# Patient Record
Sex: MALE | Race: WHITE | NOT HISPANIC OR LATINO | URBAN - METROPOLITAN AREA
[De-identification: names, ages, dates, MRNs, and addresses within clinical notes are randomized per-mention and may not be internally consistent; named-entity substitution may affect disease eponyms.]

---

## 2017-07-12 ENCOUNTER — OFFICE VISIT (OUTPATIENT)
Dept: BEHAVIORAL HEALTH | Facility: PHYSICIAN GROUP | Age: 22
End: 2017-07-12
Payer: COMMERCIAL

## 2017-07-12 DIAGNOSIS — Z79.899 LONG TERM USE OF DRUG: ICD-10-CM

## 2017-07-12 DIAGNOSIS — F31.9 BIPOLAR I DISORDER (HCC): ICD-10-CM

## 2017-07-12 DIAGNOSIS — F41.1 GAD (GENERALIZED ANXIETY DISORDER): ICD-10-CM

## 2017-07-12 DIAGNOSIS — Z79.899 LITHIUM USE: ICD-10-CM

## 2017-07-12 PROCEDURE — 90792 PSYCH DIAG EVAL W/MED SRVCS: CPT | Performed by: NURSE PRACTITIONER

## 2017-07-12 RX ORDER — ILOPERIDONE 8 MG/1
TABLET ORAL
Refills: 2 | COMMUNITY
Start: 2017-06-29 | End: 2017-07-31 | Stop reason: SDUPTHER

## 2017-07-12 RX ORDER — BENZTROPINE MESYLATE 0.5 MG/1
TABLET ORAL
Refills: 0 | COMMUNITY
Start: 2017-06-23 | End: 2017-08-09 | Stop reason: SDUPTHER

## 2017-07-12 RX ORDER — LORATADINE 10 MG/1
10 TABLET ORAL DAILY
COMMUNITY

## 2017-07-12 RX ORDER — LITHIUM CARBONATE 600 MG/1
CAPSULE ORAL
Refills: 0 | COMMUNITY
Start: 2017-07-05 | End: 2017-08-02 | Stop reason: SDUPTHER

## 2017-07-12 RX ORDER — ILOPERIDONE 12 MG/1
TABLET ORAL
Refills: 0 | COMMUNITY
Start: 2017-06-01 | End: 2017-07-31 | Stop reason: SDUPTHER

## 2017-07-12 NOTE — PROGRESS NOTES
BEHAVIORAL HEALTH  INITIAL PSYCHIATRIC EVALUATION    Name: Rafael Parker  MRN: [id number]  : 1995  Age: 21 y.o.  Date of assessment: 2017  PCP: Marin Longo M.D.  Persons in attendance:Patient and mom, patient also interviewed alone  Total face-to-face time: 52 minutes    CHIEF COMPLAINT AND HISTORY OF PRESENTING PROBLEM:   (As stated by Patient)  Rafael Parker is a 21 y.o., White male referred for assessment by No ref. provider found. Primary presenting issue includes: establishing treatment for bipolar disorder and anxiety medication managment.    HISTORY OF PRESENT ILLNESS:    Patient and mother report he has been seeing a child psychiatrist in [place], would like to now see someone local that can prescribe medications. At age 18 patient was diagnosed bipolar I, after a manic episode with psychosis resulting in a [facility] stay. Patient reports he had bouts of depression during childhood preceeding the manic event. While panic patient had paranoia, grandiosity, did not need to sleep and reports bizarre a/v hallucinations. Patient has been fairly stable overall since getting on the right combination of medication, currently takes lithium , Fanapt, and cogentin. Patient at times has anxiety attacks, has a prescription for anxiety that he rarely takes. 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 Sleeping 9-10 hours/night, denies any suicidal thoughts or thoughts of self harm. Patient does tend to worry a lot about lots of different things. Current life stressors include family financial stress. 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     CURRENT PSYCHIATRIC MEDS:  Lithium 600mg BID  Cogentin 1 mg take 0.5 mg BID  Fanapt, takes 8 mg in morning and 12 hs    PAST PSYCHIATRIC MEDICATIONS TRIED:  Risperidone-caused gynecomastia,  "Parkinsonism  lamictal-patient got rash  Geodon, Seroquel-did not work  Depakote, took only a few weeks not sure why stopped  Intuniv-cannot recall why he was taking it, maybe anxiety or ADHD but was not \"too bad\"   FAMILY/SOCIAL HISTORY:  Does patient/parent report a family history of behavioral health issues, diagnoses, or treatment? mom has depression, sister bipolar  Family History   Problem Relation Age of Onset   • Depression Mother    • Other Mother      chiari malformation   • Bipolar disorder Sister      ?   • Asthma Sister      Marital status: single  Current living situation/household members: lives with family  Relevant family history/structure/dynamics: Born/raised in [facility] [facility]. family intact, no abuse. Closer to his mom than his father who can be more boudreaux.   Quality/quantity of current family and/or social support: supportive family, has made good friends    Social History     Social History   • Marital Status: Single     Spouse Name: N/A   • Number of Children: N/A   • Years of Education: N/A     Occupational History   • Not on file.     Social History Main Topics   • Smoking status: Never Smoker    • Smokeless tobacco: Not on file   • Alcohol Use: No   • Drug Use: No   • Sexual Activity: Not on file     Other Topics Concern   • Not on file     Social History Narrative         EMPLOYMENT/RESOURCES  Is the patient currently employed? yes  History of employment problems?no  Does the patient/parent report adequate financial resources? Yes  Does patient identify impact of presenting issue on work functioning?no   Work or income-related stressors:  New career/job     HISTORY:  Does patient report current or past enlistment? no   If yes, describe experiences of duty: n/a    SPIRITUAL/CULTURAL/IDENTITY:  What are the patient’s/family’s spiritual beliefs or practices? jacobo  What is the patient’s cultural or ethnic background/identity?   How does the patient identify their sexual " orientation? heterosexual  How does the patient identify their gender? male  Does the patient identify any spiritual/cultural/identity factors as relevant to the presenting issue? no      PSYCHIATRIC TREATMENT HISTORY:  Does patient/parent report a history of outpatient psychiatric treatment for patient? Patient saw Dr. Ingram in [place] most recently, also saw Dr. Espinoza at [facility] in past       Does patient/parent report a history of psychiatric hospitalizations for patient? Yes, [place] age 18      Does patient/parent report a history of psychotherapy or other behavioral health treatment for patient?   Counseling in the past        PAST MEDICAL HISTORY:    Eczema, allergies, hypoglycemia, repair of thumb (cut)      REVIEW OF SYSTEMS:      General appearance: casually dressed  male, appears stated age of 21. No gross deformities noted. Pleasant and cooperative.    Constitutional Maybe fever in past few days, not feeling well with cold   Eyes Wears eyeglasses, no glaucoma or cataracts   Ears/Nose/Mouth/Throat Bilateral hearing intact, currently has sore throat   Cardiovascular No HTN or arrhythmia   Respiratory Productive cough   Gastrointestinal No diarrhea or constipation   Genitourinary Frequent urination, drinks a lot of water   Muscular/skeletal No pain, no joint or bone pain or muscle problems   Integumentary Eczema, on fingers   Neurological Tremor in hands, side effect of lithium. No seizure history   Endocrine No diabetes, does have hypoglycemia at times. No thyroid  Disease. Feels his blood sugar low, relieved when eating   Hematologic/Lymphatic No anemia or blood clotting problems        Past Medical History   Diagnosis Date   • Eczema    • Bipolar disorder (CMS-HCC)    • Anxiety    • Hypoglycemia      patient reports       LABS REVIEWED: no recent available    Medication Allergies  Review of patient's allergies indicates not on file.    Medications (non psychiatric)  Current Outpatient  Prescriptions   Medication Sig Dispense Refill   • benztropine (COGENTIN) 0.5 MG Tab TK 1 T PO BID  0   • FANAPT 8 MG Tab TK 1 TO 1 AND 1/2 TS PO D  2   • FANAPT 12 MG Tab TK 1 T PO QHS  0   • lithium 600 MG capsule TK 1 C PO BID  0   • loratadine (CLARITIN) 10 MG Tab Take 10 mg by mouth every day.       No current facility-administered medications for this visit.       DEVELOPMENTAL HISTORY:  Delivery:post date pregnancy  Birth weight: 8 lbs 14 oz  Prenatal complications:  Mom had pre-eclampsia, had to take magnesium   complications:  Shoulder dystocia   complications:  Allergic to formula at early age  In utero substance exposure:  no    Reached developmental milestones within normal limits? Patient reports executive function deficit diagnosed as child   Gross motor:  yes  Fine motor:  yes   Speech:  yes   Social interaction:  Patient used to have some difficulty keeping friends, now is doing better.     EDUCATIONAL:  Highest level of education completed? 12th grade, +CNA training  Typical grades received:good grades, graduated with advanced diploma  History of problems at school as child/adolescent: patient had IEP plan  Is patient currently enrolled in a school/educational program? No, but plans to continue his education      Psychiatric Review of Systems:   Mood: Other: euthymic  Anxiety: Frequent worry  Sleep: Typical hours per night: 9 hours  Psychomotor/Energy: Other: within normal limits  Eating/Appetite: Other: within normal limits  Cognitive: Other: dx with executive function problem as child  Behavior: Other: no risky behaviors   Psychosis: Other: history of paranoia and a/v hallucinations while manic, none currently  Social: Other: previously struggled to keep friends, is doing better now and has a few good friends  Sensory: Other: within normal limits         LEGAL HISTORY:  Has the patient ever been involved with juvenile, adult, or family legal systems? no      ABUSE/NEGLECT  SCREENING:  Does patient report feeling “unsafe” in his/her home, or afraid of anyone? no  Does patient report any history of physical, sexual, or emotional abuse? no  Does parent or significant other report any of the above? no  Is there evidence of neglect by self? no  Is there evidence of neglect by a caregiver? no  Does the patient/parent report any history of CPS/APS/police involvement related to suspected abuse/neglect or domestic violence? no    Based on the information provided during the current assessment, is a mandated report of suspected abuse/neglect being made? no      SAFETY ASSESSMENT - SELF:  Does patient acknowledge current or past symptoms of dangerousness to self? Past thoughts of self harm, never cut himself or had any suicide attempts     Does parent/significant other report patient has current or past symptoms of dangerousness to self? no      History of suicide by family member: no  History of suicide by friend/significant other: no    Recent change in amount/specificity/intensity of suicidal thoughts or self-harm behavior?no  Current access to firearms, medications, or other identified means of suicide/self-harm? n/a  If yes, willing to restrict access to means of suicide/self-harm? n/a  Protective factors present: Future-oriented and Strong family connections    Current Suicide Risk: Low  Safety Plan completed, documented in chart, and copy given to patient: No     Crisis Safety Plan completed and copy given to patient: No     SAFETY ASSESSMENT - OTHERS:  Does patient acknowledge current or past symptoms of aggressive behavior or risk to others? none      SUBSTANCE USE/ADDICTION HISTORY:  [] Not applicable - patient 10 years of age or younger    Is there a family history of substance use/addiction? no  Does patient acknowledge or parent/significant other report use of/dependence on substances? no  Last time patient used alcohol: none  Within the past week? n/a  Last time patient used  "marijuana: none  Within the past month? n/a  Any other street drugs ever tried even once? no  Any use of prescription medications/pills without a prescription, or for reasons others than originally prescribed?  no  Any other addictive behavior reported (gambling, shopping, sex)? no    Drug History:  Amphetamine:      Cannibis:      Cocaine:      Ecstasy:      Hallucinogen:      Inhalant:       Opiate:      Other:      Sedative:         What consequences does the patient associate with any of the above substance use and or addictive behaviors?   None        [x] Patient denies use of any substance/addictive behaviors    STRENGTHS/ASSETS:  Strengths Identified by interviewer: Insight into problems, Evidence of good judgement, Self-awareness and Family suppport  Strengths Identified by patient: kind heart, empathetic towards others    MENTAL STATUS EXAM/OBSERVATIONS  Resp 15  Ht 1.778 m (5' 10\")  Wt 74.844 kg (165 lb)  BMI 23.68 kg/m2  Participation: Active verbal participation, Attentive, Engaged and Open to feedback  Grooming:  Casual and Neat  Orientation: Fully Oriented   Eye contact:  Good  Behavior: Calm   Mood:  Euthymic and Anxious  Affect: Full range and Congruent with content  Thought process: Logical and Goal-directed  Thought content: Within normal limits  Speech: Rate within normal limits and Volume within normal limits  Perception: Within normal limits  Memory:  No gross evidence of memory deficits  Insight:  Good  Judgment: Good  Other:   Family/couple interaction observations: mutually respectful towards eachother    RESULTS OF SCREENING MEASURES: none      CLINICAL FORMULATION: 21 year old  hetero male, presents with history consistent with bipolar I disorder and RODRIGO. Hospitalized for manic episode at age 18, mood stable on current medications. Patient has supportive family, no history of abuse. Willing to engage in treatment. Strong history of allergies and eczema, frequent urination. Mom " has depression, sister diagnosed with bipolar. Family reports patient previously diagnosed with executive function disorder, had IEP during school but graduated with advanced diploma and looks forward to going to college. No dependence on drugs or alcohol, no suicide attempts.       DIAGNOSTIC IMPRESSION(S):  1. Bipolar I disorder (CMS-HCC)    2. RODRIGO (generalized anxiety disorder)    3. Lithium use    4. Long term use of drug         IDENTIFIED NEEDS/PLAN: continue current medications. Reviewed risks/benefits of each medication with patient and mother, reviewed lithium toxicity s/s, need to monitor lithium level,kidney function, weight, thyroid function over time. Metabolic risks of Fanapt discussed, will monitor glucose as well as lipids. Patient offered referral to individual therapy to help him process his anxiety, will consider but declines today. Educate patient and mom need to check for drug-drug interactions, NSAIDS not safe to take with lithium.       Current outpatient prescriptions:   •  benztropine (COGENTIN) 0.5 MG Tab, TK 1 T PO BID, Disp: , Rfl: 0  •  FANAPT 8 MG Tab, TK 1 TO 1 AND 1/2 TS PO D, Disp: , Rfl: 2  •  FANAPT 12 MG Tab, TK 1 T PO QHS, Disp: , Rfl: 0  •  lithium 600 MG capsule, TK 1 C PO BID, Disp: , Rfl: 0  •  loratadine (CLARITIN) 10 MG Tab, Take 10 mg by mouth every day., Disp: , Rfl:     Does patient express agreement with the above plan? yes    Referral appointment(s) scheduled? no    Recheck 2-3 months or sooner if needed    EVIN Cary.

## 2017-07-14 VITALS — BODY MASS INDEX: 23.62 KG/M2 | RESPIRATION RATE: 15 BRPM | HEIGHT: 70 IN | WEIGHT: 165 LBS

## 2017-07-14 PROBLEM — F41.1 GAD (GENERALIZED ANXIETY DISORDER): Status: ACTIVE | Noted: 2017-07-14

## 2017-07-14 PROBLEM — F31.9 BIPOLAR I DISORDER (HCC): Status: ACTIVE | Noted: 2017-07-14

## 2017-07-31 RX ORDER — ILOPERIDONE 12 MG/1
TABLET ORAL
Qty: 30 TAB | Refills: 1 | Status: SHIPPED | OUTPATIENT
Start: 2017-07-31 | End: 2017-09-13 | Stop reason: SDUPTHER

## 2017-07-31 RX ORDER — ILOPERIDONE 8 MG/1
TABLET ORAL
Qty: 45 TAB | Refills: 1 | Status: SHIPPED | OUTPATIENT
Start: 2017-07-31 | End: 2017-09-13 | Stop reason: SDUPTHER

## 2017-08-02 RX ORDER — LITHIUM CARBONATE 600 MG/1
CAPSULE ORAL
Qty: 60 CAP | Refills: 1 | Status: SHIPPED | OUTPATIENT
Start: 2017-08-02 | End: 2017-09-13 | Stop reason: SDUPTHER

## 2017-08-09 RX ORDER — BENZTROPINE MESYLATE 0.5 MG/1
TABLET ORAL
Qty: 60 TAB | Refills: 1 | Status: SHIPPED | OUTPATIENT
Start: 2017-08-09 | End: 2017-11-14 | Stop reason: SDUPTHER

## 2017-09-13 ENCOUNTER — HOSPITAL ENCOUNTER (OUTPATIENT)
Dept: LAB | Facility: MEDICAL CENTER | Age: 22
End: 2017-09-13
Attending: NURSE PRACTITIONER
Payer: COMMERCIAL

## 2017-09-13 ENCOUNTER — OFFICE VISIT (OUTPATIENT)
Dept: BEHAVIORAL HEALTH | Facility: PHYSICIAN GROUP | Age: 22
End: 2017-09-13
Payer: COMMERCIAL

## 2017-09-13 VITALS
SYSTOLIC BLOOD PRESSURE: 102 MMHG | WEIGHT: 168.5 LBS | HEIGHT: 70 IN | DIASTOLIC BLOOD PRESSURE: 76 MMHG | BODY MASS INDEX: 24.12 KG/M2 | HEART RATE: 84 BPM

## 2017-09-13 DIAGNOSIS — F31.9 BIPOLAR I DISORDER (HCC): ICD-10-CM

## 2017-09-13 DIAGNOSIS — Z79.899 LONG TERM USE OF DRUG: ICD-10-CM

## 2017-09-13 DIAGNOSIS — F41.1 GAD (GENERALIZED ANXIETY DISORDER): ICD-10-CM

## 2017-09-13 DIAGNOSIS — Z79.899 LITHIUM USE: ICD-10-CM

## 2017-09-13 LAB
ALBUMIN SERPL BCP-MCNC: 4.6 G/DL (ref 3.2–4.9)
ALBUMIN/GLOB SERPL: 1.8 G/DL
ALP SERPL-CCNC: 90 U/L (ref 30–99)
ALT SERPL-CCNC: 17 U/L (ref 2–50)
ANION GAP SERPL CALC-SCNC: 8 MMOL/L (ref 0–11.9)
AST SERPL-CCNC: 19 U/L (ref 12–45)
BILIRUB SERPL-MCNC: 0.5 MG/DL (ref 0.1–1.5)
BUN SERPL-MCNC: 14 MG/DL (ref 8–22)
CALCIUM SERPL-MCNC: 9.7 MG/DL (ref 8.5–10.5)
CHLORIDE SERPL-SCNC: 106 MMOL/L (ref 96–112)
CO2 SERPL-SCNC: 25 MMOL/L (ref 20–33)
CREAT SERPL-MCNC: 0.85 MG/DL (ref 0.5–1.4)
GFR SERPL CREATININE-BSD FRML MDRD: >60 ML/MIN/1.73 M 2
GLOBULIN SER CALC-MCNC: 2.6 G/DL (ref 1.9–3.5)
GLUCOSE SERPL-MCNC: 109 MG/DL (ref 65–99)
LITHIUM SERPL-MCNC: 0.3 MMOL/L (ref 0.6–1.2)
POTASSIUM SERPL-SCNC: 3.7 MMOL/L (ref 3.6–5.5)
PROT SERPL-MCNC: 7.2 G/DL (ref 6–8.2)
SODIUM SERPL-SCNC: 139 MMOL/L (ref 135–145)
TSH SERPL DL<=0.005 MIU/L-ACNC: 0.67 UIU/ML (ref 0.3–3.7)

## 2017-09-13 PROCEDURE — 36415 COLL VENOUS BLD VENIPUNCTURE: CPT

## 2017-09-13 PROCEDURE — 84443 ASSAY THYROID STIM HORMONE: CPT

## 2017-09-13 PROCEDURE — 80178 ASSAY OF LITHIUM: CPT

## 2017-09-13 PROCEDURE — 99213 OFFICE O/P EST LOW 20 MIN: CPT | Performed by: NURSE PRACTITIONER

## 2017-09-13 PROCEDURE — 80053 COMPREHEN METABOLIC PANEL: CPT

## 2017-09-13 PROCEDURE — 90833 PSYTX W PT W E/M 30 MIN: CPT | Performed by: NURSE PRACTITIONER

## 2017-09-13 RX ORDER — LITHIUM CARBONATE 600 MG/1
CAPSULE ORAL
Qty: 60 CAP | Refills: 1 | Status: SHIPPED | OUTPATIENT
Start: 2017-09-13 | End: 2017-11-19 | Stop reason: SDUPTHER

## 2017-09-13 NOTE — PROGRESS NOTES
[facility]  PSYCHIATRIC FOLLOW-UP NOTE    Name: Rafael Parker  MRN: [id number]  : 1995  Age: 22 y.o.  Date of assessment: 2017  PCP: Marin Longo M.D.  Persons in attendance: Patient  Total face-to-face time: 29 minutes    REASON FOR VISIT/CHIEF COMPLAINT (as stated by Patient):  Rafael Parker is a 22 y.o., White male, attending follow-up appointment for recheck of bipolar disorder and RODRIGO.      HISTORY OF PRESENT ILLNESS:    Patient reports he has been feeling anxious today, not certain of whether it is because he has had 2 doctor appointments, got his eyes dilated today, or just because he has a lot on his mind. Feels he is breathing more rapidly right now. Verbalizes he has dealt with anxiety all of his life, and it doesn't stop him from functioning but he is noticing more frequent anxious feelings. 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 Does not feel depressed, is sleeping fairly well considering he is working nights at [employer] center and has to sleep during the day. On his days off he finds it harder to switch back to sleeping at night, prefers to sleep during the day. Denies suicidal thoughts or any problems with irma. Patient does endorse some compliance problems recently with medication, may have missed days of meds as he is tired related to work schedule and is wondering if that may be making him feel anxious.    PSYCHOSOCIAL CHANGES SINCE PREVIOUS CONTACT:  No drinking or drug use      MEDICATION SIDE EFFECTS: none    REVIEW OF SYSTEMS:      General appearance: casually dressed  male, good grooming/hygiene. Pleasant and cooperative.   Constitutional negative - no fever/chills   Eyes not tested   Ears/Nose/Mouth/Throat not tested   Cardiovascular not tested   Respiratory not tested   Gastrointestinal negative   Genitourinary not tested   Muscular not tested   Integumentary not  "tested   Neurological negative   Endocrine not tested   Hematologic/Lymphatic not tested       PSYCHIATRIC EXAMINATION/MENTAL STATUS  /76   Pulse 84   Ht 1.778 m (5' 10\")   Wt 76.4 kg (168 lb 8 oz)   BMI 24.18 kg/m²   Participation: Active verbal participation  Grooming:Casual and Neat  Orientation: Fully Oriented  Eye contact: Good  Behavior:Tense   Mood: Anxious  Affect: Full range and Congruent with content  Thought process: Logical and Goal-directed  Thought content:  Within normal limits  Speech: Rate within normal limits and Volume within normal limits  Perception:  Within normal limits  Memory:  No gross evidence of memory deficits  Insight: Good  Judgment: Good  Family/couple interaction observations:   Other:    Current risk:    Suicide: Low   Homicide: Not applicable   Self-harm: Low  Relapse: Not applicable  Other:   Crisis Safety Plan reviewed?No  If evidence of imminent risk is present, intervention/plan:    Medical Records/Labs/Diagnostic Tests Reviewed: none, patient did not complete labwork previously ordered    Medical Records/Labs/Diagnostic Tests Ordered: reprinted lab orders for lipids, cmp, tsh, lithium level    DIAGNOSTIC IMPRESSION(S):  1. Bipolar I disorder (CMS-HCC)    2. RODRIGO (generalized anxiety disorder)           ASSESSMENT AND PLAN: mood fairly stable, anxiety is increasing  Patient offered trial of clonazepam, declines today because he worries it will make him tired or make him more anxious; did not like how Ativan made him feel in the past. Encourage patient to get labs done, need to monitor metabolic parameters, kidney function, lithium level given the meds he is taking. Again offered referral to individual therapy, declines today. Stress missing dosing of medication can contribute to feelings of anxiety. Continue current medications.       Current Outpatient Prescriptions:   •  NON SPECIFIED, , Disp: , Rfl:   •  Iloperidone (FANAPT) 12 MG Tab, Take 1 Tab by mouth every " "day., Disp: 30 Tab, Rfl: 1  •  Iloperidone (FANAPT) 8 MG Tab, Take 1-1.5 tablet by mouth every day., Disp: 45 Tab, Rfl: 1  •  lithium 600 MG capsule, TAKE 1 CAPSULE BY MOUTH TWICE DAILY, Disp: 60 Cap, Rfl: 1  •  benztropine (COGENTIN) 0.5 MG Tab, TAKE 1 TABLET BY MOUTH TWICE DAILY, Disp: 60 Tab, Rfl: 1  •  loratadine (CLARITIN) 10 MG Tab, Take 10 mg by mouth every day., Disp: , Rfl:       18 minutes were spent in psychotherapy.  (If greater than 16 minutes, add 63161 code)   Topics addressed in psychotherapy include: normalize anxiety given number of life stressors and likely changes he is facing relating to move and his job. Encourage patient to identify something fun and relaxing he can do to relieve his anxiety, patient is willing to schedule a social outing and take time out of his busy day. Discussed relaxation breathing, encourage him to go to a quiet, dark room and do some \"belly\" breathing with slow deep breaths when he notices he is becoming tense. Patient processed his decisions about moving with family out of the area.    Follow up 1 month or sooner if needed    EVIN Cary.                     "

## 2017-09-16 ENCOUNTER — HOSPITAL ENCOUNTER (OUTPATIENT)
Dept: LAB | Facility: MEDICAL CENTER | Age: 22
End: 2017-09-16
Attending: NURSE PRACTITIONER
Payer: COMMERCIAL

## 2017-09-16 DIAGNOSIS — Z79.899 LONG TERM USE OF DRUG: ICD-10-CM

## 2017-09-16 LAB
CHOLEST SERPL-MCNC: 147 MG/DL (ref 100–199)
HDLC SERPL-MCNC: 50 MG/DL
LDLC SERPL CALC-MCNC: 82 MG/DL
TRIGL SERPL-MCNC: 73 MG/DL (ref 0–149)

## 2017-09-16 PROCEDURE — 80061 LIPID PANEL: CPT

## 2017-09-16 PROCEDURE — 36415 COLL VENOUS BLD VENIPUNCTURE: CPT

## 2017-10-25 ENCOUNTER — OFFICE VISIT (OUTPATIENT)
Dept: BEHAVIORAL HEALTH | Facility: PHYSICIAN GROUP | Age: 22
End: 2017-10-25
Payer: COMMERCIAL

## 2017-10-25 VITALS
BODY MASS INDEX: 24.62 KG/M2 | WEIGHT: 172 LBS | HEART RATE: 74 BPM | DIASTOLIC BLOOD PRESSURE: 70 MMHG | HEIGHT: 70 IN | SYSTOLIC BLOOD PRESSURE: 120 MMHG

## 2017-10-25 DIAGNOSIS — F41.1 GAD (GENERALIZED ANXIETY DISORDER): ICD-10-CM

## 2017-10-25 DIAGNOSIS — F31.9 BIPOLAR I DISORDER (HCC): ICD-10-CM

## 2017-10-25 PROCEDURE — 99213 OFFICE O/P EST LOW 20 MIN: CPT | Performed by: NURSE PRACTITIONER

## 2017-10-25 PROCEDURE — 90833 PSYTX W PT W E/M 30 MIN: CPT | Performed by: NURSE PRACTITIONER
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
## 2017-11-05 ENCOUNTER — OFFICE VISIT (OUTPATIENT)
Dept: URGENT CARE | Facility: PHYSICIAN GROUP | Age: 22
End: 2017-11-05
Payer: COMMERCIAL

## 2017-11-05 VITALS
HEART RATE: 84 BPM | SYSTOLIC BLOOD PRESSURE: 104 MMHG | HEIGHT: 70 IN | BODY MASS INDEX: 24.48 KG/M2 | DIASTOLIC BLOOD PRESSURE: 74 MMHG | TEMPERATURE: 98.4 F | OXYGEN SATURATION: 98 % | RESPIRATION RATE: 16 BRPM | WEIGHT: 171 LBS

## 2017-11-05 DIAGNOSIS — R09.81 NASAL CONGESTION WITH RHINORRHEA: ICD-10-CM

## 2017-11-05 DIAGNOSIS — J02.9 SORE THROAT: ICD-10-CM

## 2017-11-05 DIAGNOSIS — J01.00 ACUTE MAXILLARY SINUSITIS, RECURRENCE NOT SPECIFIED: ICD-10-CM

## 2017-11-05 DIAGNOSIS — J34.89 NASAL CONGESTION WITH RHINORRHEA: ICD-10-CM

## 2017-11-05 LAB
INT CON NEG: NEGATIVE
INT CON POS: POSITIVE
S PYO AG THROAT QL: NORMAL

## 2017-11-05 PROCEDURE — 87880 STREP A ASSAY W/OPTIC: CPT | Performed by: NURSE PRACTITIONER

## 2017-11-05 PROCEDURE — 99204 OFFICE O/P NEW MOD 45 MIN: CPT | Performed by: NURSE PRACTITIONER

## 2017-11-05 RX ORDER — FLUTICASONE PROPIONATE 50 MCG
2 SPRAY, SUSPENSION (ML) NASAL DAILY
Qty: 1 BOTTLE | Refills: 0 | Status: SHIPPED | OUTPATIENT
Start: 2017-11-05

## 2017-11-05 RX ORDER — AMOXICILLIN AND CLAVULANATE POTASSIUM 875; 125 MG/1; MG/1
1 TABLET, FILM COATED ORAL 2 TIMES DAILY
Qty: 14 TAB | Refills: 0 | Status: SHIPPED | OUTPATIENT
Start: 2017-11-05 | End: 2017-11-12

## 2017-11-05 ASSESSMENT — ENCOUNTER SYMPTOMS
SORE THROAT: 1
EYE DISCHARGE: 0
WEAKNESS: 0
FEVER: 0
VOMITING: 0
EYE REDNESS: 0
NAUSEA: 0
HEADACHES: 0
COUGH: 0
DIZZINESS: 0
SHORTNESS OF BREATH: 0
CONSTIPATION: 0
CHILLS: 0
NECK PAIN: 0
ABDOMINAL PAIN: 0
MYALGIAS: 0
DIARRHEA: 0

## 2017-11-05 NOTE — LETTER
November 5, 2017       Patient: Rafael Parker   YOB: 1995   Date of Visit: 11/5/2017         To Whom It May Concern:    It is my medical opinion that Rafael Parker be excused from work due to illness. May return 11/7/17.    If you have any questions or concerns, please don't hesitate to call [phone]          Sincerely,          ANA ROSA Butts.P.  Electronically Signed

## 2017-11-05 NOTE — PROGRESS NOTES
Subjective:      Rafael Parker is a 22 y.o. male who presents with Pharyngitis (x3 days, sinus congestion)            HPI  Rafael is a 22 year old male who is here for sore throat and sinus problems x 3 days. 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 C/o nasal congestion with yellow nasal d/c, PND, sore throat without fever but has fatigue. No cough. Taking NSAID. Requesting work note.    PMH:  has a past medical history of Anxiety; Bipolar disorder (CMS-HCC); Eczema; and Hypoglycemia. He also has no past medical history of Seizure (CMS-HCC); Self-injurious behavior; Substance abuse; or Suicide attempt.  MEDS:   Current Outpatient Prescriptions:   •  fluticasone (FLONASE) 50 MCG/ACT nasal spray, Spray 2 Sprays in nose every day., Disp: 1 Bottle, Rfl: 0  •  amoxicillin-clavulanate (AUGMENTIN) 875-125 MG Tab, Take 1 Tab by mouth 2 times a day for 7 days., Disp: 14 Tab, Rfl: 0  •  NON SPECIFIED, , Disp: , Rfl:   •  Iloperidone (FANAPT) 12 MG Tab, Take 1 Tab by mouth every day., Disp: 30 Tab, Rfl: 1  •  Iloperidone (FANAPT) 8 MG Tab, Take 1-1.5 tablet by mouth every day., Disp: 45 Tab, Rfl: 1  •  lithium 600 MG capsule, TAKE 1 CAPSULE BY MOUTH TWICE DAILY, Disp: 60 Cap, Rfl: 1  •  benztropine (COGENTIN) 0.5 MG Tab, TAKE 1 TABLET BY MOUTH TWICE DAILY, Disp: 60 Tab, Rfl: 1  •  loratadine (CLARITIN) 10 MG Tab, Take 10 mg by mouth every day., Disp: , Rfl:   ALLERGIES:   Allergies   Allergen Reactions   • Bee Pollen    • Corticosteroids      Uncertain allergy   • Other Food      Allergic to many fruits   • Pecan    • Singulair      SURGHX:   Past Surgical History:   Procedure Laterality Date   • HAND SURGERY     • OTHER      deviated septum repair   • TONSILLECTOMY       SOCHX:  reports that he has never smoked. He has never used smokeless tobacco. He reports that he does not drink alcohol or use drugs.  FH: Family history was reviewed, no pertinent findings to report    Review of Systems   Constitutional: Positive for  "malaise/fatigue. Negative for chills and fever.   HENT: Positive for congestion, ear pain and sore throat.    Eyes: Negative for discharge and redness.   Respiratory: Negative for cough and shortness of breath.    Gastrointestinal: Negative for abdominal pain, constipation, diarrhea, nausea and vomiting.   Musculoskeletal: Negative for myalgias and neck pain.   Neurological: Negative for dizziness, weakness and headaches.   Endo/Heme/Allergies: Negative for environmental allergies.   All other systems reviewed and are negative.         Objective:     /74   Pulse 84   Temp 36.9 °C (98.4 °F)   Resp 16   Ht 1.778 m (5' 10\")   Wt 77.6 kg (171 lb)   SpO2 98%   BMI 24.54 kg/m²      Physical Exam   Constitutional: He is oriented to person, place, and time. Vital signs are normal. He appears well-developed and well-nourished. He is active and cooperative.  Non-toxic appearance. He does not have a sickly appearance. He appears ill. No distress.   HENT:   Head: Normocephalic.   Right Ear: External ear and ear canal normal. Tympanic membrane is injected.   Left Ear: External ear and ear canal normal. Tympanic membrane is injected.   Nose: Mucosal edema, rhinorrhea and sinus tenderness present. Right sinus exhibits maxillary sinus tenderness. Left sinus exhibits maxillary sinus tenderness.   Mouth/Throat: Uvula is midline. Mucous membranes are dry. No uvula swelling. Posterior oropharyngeal edema and posterior oropharyngeal erythema present.   Eyes: Conjunctivae and EOM are normal. Pupils are equal, round, and reactive to light.   Neck: Normal range of motion. Neck supple.   Cardiovascular: Normal rate and regular rhythm.    Pulmonary/Chest: Effort normal and breath sounds normal. No accessory muscle usage. No respiratory distress.   Musculoskeletal: Normal range of motion.   Lymphadenopathy:     He has no cervical adenopathy.   Neurological: He is alert and oriented to person, place, and time.   Skin: Skin is warm " and dry. He is not diaphoretic.   Vitals reviewed.              Assessment/Plan:     1. Sore throat    - POCT Rapid Strep A: NEG    2. Nasal congestion with rhinorrhea    - fluticasone (FLONASE) 50 MCG/ACT nasal spray; Spray 2 Sprays in nose every day.  Dispense: 1 Bottle; Refill: 0    3. Acute maxillary sinusitis, recurrence not specified    - amoxicillin-clavulanate (AUGMENTIN) 875-125 MG Tab; Take 1 Tab by mouth 2 times a day for 7 days.  Dispense: 14 Tab; Refill: 0    Increase water intake  Get rest  May use Ibuprofen/Tylenol prn for any fever, body aches or throat pain  May take longer acting antihistamine for seasonal allergy symptoms prn  May use saline nasal spray for nasal congestion  May use Flonase or Nasocort for allergen nasal congestion  May gargle with salt water prn for throat discomfort  May drink smoothies for nutrition if too painful to swallow solid foods  Monitor for productive cough, SOB and chest pain/tightness- need re-evaluation  Work note provided

## 2017-11-14 RX ORDER — BENZTROPINE MESYLATE 0.5 MG/1
TABLET ORAL
Qty: 60 TAB | Refills: 0 | Status: SHIPPED | OUTPATIENT
Start: 2017-11-14 | End: 2017-12-19 | Stop reason: SDUPTHER

## 2017-11-28 RX ORDER — ILOPERIDONE 12 MG/1
TABLET ORAL
Qty: 30 TAB | Refills: 1 | Status: SHIPPED | OUTPATIENT
Start: 2017-11-28 | End: 2018-01-31 | Stop reason: SDUPTHER

## 2017-12-21 RX ORDER — BENZTROPINE MESYLATE 0.5 MG/1
TABLET ORAL
Qty: 60 TAB | Refills: 0 | Status: SHIPPED | OUTPATIENT
Start: 2017-12-21 | End: 2018-01-31 | Stop reason: SDUPTHER

## 2017-12-22 RX ORDER — LITHIUM CARBONATE 600 MG/1
600 CAPSULE ORAL 2 TIMES DAILY
Qty: 60 CAP | Refills: 0 | Status: SHIPPED | OUTPATIENT
Start: 2017-12-22 | End: 2018-01-31 | Stop reason: SDUPTHER

## 2018-01-31 ENCOUNTER — OFFICE VISIT (OUTPATIENT)
Dept: BEHAVIORAL HEALTH | Facility: PHYSICIAN GROUP | Age: 23
End: 2018-01-31
Payer: COMMERCIAL

## 2018-01-31 VITALS
WEIGHT: 183.5 LBS | HEIGHT: 70 IN | SYSTOLIC BLOOD PRESSURE: 120 MMHG | BODY MASS INDEX: 26.27 KG/M2 | DIASTOLIC BLOOD PRESSURE: 82 MMHG | HEART RATE: 84 BPM

## 2018-01-31 DIAGNOSIS — F31.9 BIPOLAR I DISORDER (HCC): ICD-10-CM

## 2018-01-31 DIAGNOSIS — F41.1 GAD (GENERALIZED ANXIETY DISORDER): ICD-10-CM

## 2018-01-31 PROCEDURE — 99213 OFFICE O/P EST LOW 20 MIN: CPT | Performed by: NURSE PRACTITIONER

## 2018-01-31 RX ORDER — LITHIUM CARBONATE 600 MG/1
600 CAPSULE ORAL 2 TIMES DAILY
Qty: 120 CAP | Refills: 0 | Status: SHIPPED | OUTPATIENT
Start: 2018-01-31

## 2018-01-31 RX ORDER — BENZTROPINE MESYLATE 0.5 MG/1
TABLET ORAL
Qty: 120 TAB | Refills: 0 | Status: SHIPPED | OUTPATIENT
Start: 2018-01-31

## 2018-01-31 ASSESSMENT — ABNORMAL INVOLUNTARY MOVEMENT SCALE (AIMS)
PATIENT_WEARS_DENTURES: NO
LOWER_BODY_EXTREMITIES: NONE, NORMAL
FACIAL_EXPRESSION_MUSCLES: NONE, NORMAL
AIMS_PATIENT_INCAPACITATION: NONE, NORMAL
AIMS_SEVERITY: 2
TONGUE: MILD
JAW: NONE, NORMAL
NECK_SHOULDER_HIPS: NONE, NORMAL
UPPER_BODY_EXTREMITIES: MINIMAL
LIPS_PARIETAL: NONE, NORMAL
AIMS_PATIENT_AWARENESS: AWARE, NO DISTRESS
CURRENT_PROBLEMS_TEETH_DENTURES: NO

## 2018-01-31 NOTE — PROGRESS NOTES
"[facility]  PSYCHIATRIC FOLLOW-UP NOTE    Name: Rafael Parker  MRN: [id number]  : 1995  Age: 22 y.o.  Date of assessment: 2018  PCP: Marin Longo M.D.  Persons in attendance: Patient  Total face-to-face time: 20 minutes    REASON FOR VISIT/CHIEF COMPLAINT (as stated by Patient):  Rafael Parker is a 22 y.o., White male, attending follow-up appointment for anxiety and bipolar disorder.      HISTORY OF PRESENT ILLNESS:    Patient reports overall his mood has been stable. He is sleeping okay given he has somewhat of an unpredictable schedule with work hours. Does not feel depressed, no manic behavior to report. Denies suicidal thoughts. He denies side effects of his medication, feels they are working well. Moments of higher anxiety, family is in the process of moving to [place] and moving tasks and planning has been stressful to all of them.     PSYCHOSOCIAL CHANGES SINCE PREVIOUS CONTACT:  No drinking or drug use      MEDICATION SIDE EFFECTS: denies    REVIEW OF SYSTEMS:      General appearance: casually dressed  male, good grooming/hygiene. Pleasant and cooperative.   Constitutional negative - no fever/chills   Eyes not tested   Ears/Nose/Mouth/Throat not tested   Cardiovascular not tested   Respiratory not tested   Gastrointestinal negative   Genitourinary not tested   Muscular not tested   Integumentary not tested   Neurological AIMS score completed. Some abnormal movement of tongue   Endocrine not tested   Hematologic/Lymphatic not tested       PSYCHIATRIC EXAMINATION/MENTAL STATUS  /82   Pulse 84   Ht 1.778 m (5' 10\")   Wt 83.2 kg (183 lb 8 oz)   BMI 26.33 kg/m²   Participation: Active verbal participation, Attentive, Engaged and Open to feedback  Grooming:Casual and Neat  Orientation: Fully Oriented  Eye contact: Good  Behavior:Calm   Mood: Euthymic  Affect: Full range and Congruent with content  Thought process: Logical and Goal-directed  Thought content:  " Within normal limits  Speech: Rate within normal limits and Volume within normal limits  Perception:  Within normal limits  Memory:  No gross evidence of memory deficits  Insight: Good  Judgment: Good  Family/couple interaction observations:   Other:    Current risk:    Suicide: Low   Homicide: Not applicable   Self-harm: Low  Relapse: Not applicable  Other:   Crisis Safety Plan reviewed?No  If evidence of imminent risk is present, intervention/plan:    Medical Records/Labs/Diagnostic Tests Reviewed: AIMS test:     AIMS Screening 1/31/2018   Muscles of Facial Expression 0   Lips and Perioral Area 0   Jaw 0   Tongue 2   Upper (arms, wrists, hands, fingers) 1   Lower (legs, knees, ankles, toes) 0   Neck, shoulders, hips 0   Severity of abnormal movements (highest score from 1-7) 2   Incapacitation due to abnormal movements 0   Patient's awareness of abnormal movements (rate only patient's report) 1   Current problems with teeth and/or dentures? No   Does patient usually wear dentures? No       Medical Records/Labs/Diagnostic Tests Ordered: none    DIAGNOSTIC IMPRESSION(S):  1. Bipolar I disorder (CMS-HCC)    2. RODRIGO (generalized anxiety disorder)           ASSESSMENT AND PLAN:  -decrease Fanapt given he is having abnormal involuntary tongue movements, mild. Reviewed long term risk of movement disorder with patient. Patient is moving, advise he follow up with new provider about his doses and the abnormal movements.   -continue lithium, mood is stable at this time  -continues to feel anxious on a regular basis. Has declined medication for anxiety, consider gabapentin for his anxiety in the future.  -3 months of medication refills provided today, discussed he can follow up before moving if he needs to. Advise him to find a provider in [place] as soon as he can, will be due for bloodwork in the next couple of months again.       Current Outpatient Prescriptions:   •  Iloperidone (FANAPT) 12 MG Tab, Take 1 Tab by mouth every  day., Disp: 90 Tab, Rfl: 0  •  Iloperidone (FANAPT) 8 MG Tab, Take 4-8 mg by mouth every day., Disp: 90 Tab, Rfl: 0  •  lithium 600 MG capsule, Take 1 Cap by mouth 2 Times a Day., Disp: 120 Cap, Rfl: 0  •  benztropine (COGENTIN) 0.5 MG Tab, TAKE 1 TABLET BY MOUTH TWICE DAILY, Disp: 120 Tab, Rfl: 0  •  fluticasone (FLONASE) 50 MCG/ACT nasal spray, Spray 2 Sprays in nose every day., Disp: 1 Bottle, Rfl: 0  •  NON SPECIFIED, , Disp: , Rfl:   •  loratadine (CLARITIN) 10 MG Tab, Take 10 mg by mouth every day., Disp: , Rfl:     Recheck as needed    EVIN Cary.

## 2018-02-21 RX ORDER — BENZTROPINE MESYLATE 0.5 MG/1
TABLET ORAL
Qty: 60 TAB | Refills: 1 | Status: SHIPPED | OUTPATIENT
Start: 2018-02-21 | End: 2018-04-11 | Stop reason: SDUPTHER

## 2018-04-11 ENCOUNTER — TELEPHONE (OUTPATIENT)
Dept: BEHAVIORAL HEALTH | Facility: PHYSICIAN GROUP | Age: 23
End: 2018-04-11

## 2018-04-11 RX ORDER — BENZTROPINE MESYLATE 0.5 MG/1
TABLET ORAL
Qty: 60 TAB | Refills: 0 | Status: SHIPPED | OUTPATIENT
Start: 2018-04-11

## 2018-04-11 NOTE — TELEPHONE ENCOUNTER
Returned call to patient. Patient reports he is now in [place], moved with his family. He met with a new PCP, will not be able to manage his medication for him. States he is scheduled to see a psychiatrist on 4/18, but will be running out of his medication prior to then. He has new insurance and will likely need a PA as well. He is okay on his supply of lithium. Discussed prior authorization can take a few days, may need to consider paying out of pocket for a few days just to stay on his medications until approval goes through. States he is feeling a little down, denies suicidal thoughts since moving and losing his friends since he is new in town. Reviewed he can go to emergency room if needed for psych emergencies. Help him normalize his anxiety, has gone through a big life change. Medications sent in to [facility] in [place] for him, discussed provider will start prior auth.

## 2018-05-03 ENCOUNTER — DOCUMENTATION (OUTPATIENT)
Dept: BEHAVIORAL HEALTH | Facility: PHYSICIAN GROUP | Age: 23
End: 2018-05-03